# Patient Record
Sex: FEMALE | Race: BLACK OR AFRICAN AMERICAN | NOT HISPANIC OR LATINO | Employment: UNEMPLOYED | URBAN - METROPOLITAN AREA
[De-identification: names, ages, dates, MRNs, and addresses within clinical notes are randomized per-mention and may not be internally consistent; named-entity substitution may affect disease eponyms.]

---

## 2022-04-09 ENCOUNTER — HISTORICAL (OUTPATIENT)
Dept: ADMINISTRATIVE | Facility: HOSPITAL | Age: 37
End: 2022-04-09

## 2022-04-27 VITALS
SYSTOLIC BLOOD PRESSURE: 138 MMHG | DIASTOLIC BLOOD PRESSURE: 92 MMHG | WEIGHT: 179 LBS | HEIGHT: 66 IN | BODY MASS INDEX: 28.77 KG/M2

## 2023-06-03 DIAGNOSIS — Z36.2 ENCOUNTER FOR FOLLOW-UP ULTRASOUND OF FETAL ANATOMY: Primary | ICD-10-CM

## 2023-06-09 ENCOUNTER — OFFICE VISIT (OUTPATIENT)
Dept: MATERNAL FETAL MEDICINE | Facility: CLINIC | Age: 38
End: 2023-06-09
Payer: MEDICAID

## 2023-06-09 ENCOUNTER — PROCEDURE VISIT (OUTPATIENT)
Dept: MATERNAL FETAL MEDICINE | Facility: CLINIC | Age: 38
End: 2023-06-09
Payer: MEDICAID

## 2023-06-09 VITALS
DIASTOLIC BLOOD PRESSURE: 71 MMHG | WEIGHT: 285 LBS | BODY MASS INDEX: 45.8 KG/M2 | HEART RATE: 76 BPM | SYSTOLIC BLOOD PRESSURE: 101 MMHG | HEIGHT: 66 IN

## 2023-06-09 DIAGNOSIS — Z36.2 ENCOUNTER FOR FOLLOW-UP ULTRASOUND OF FETAL ANATOMY: Primary | ICD-10-CM

## 2023-06-09 DIAGNOSIS — O10.013 PRE-EXISTING ESSENTIAL HYPERTENSION COMPLICATING PREGNANCY IN THIRD TRIMESTER: Primary | ICD-10-CM

## 2023-06-09 DIAGNOSIS — Z36.2 ENCOUNTER FOR FOLLOW-UP ULTRASOUND OF FETAL ANATOMY: ICD-10-CM

## 2023-06-09 DIAGNOSIS — O09.523 SUPERVISION OF ELDERLY MULTIGRAVIDA IN THIRD TRIMESTER (>=35 YEARS OLD AT TIME OF DELIVERY): ICD-10-CM

## 2023-06-09 DIAGNOSIS — O99.213 OBESITY COMPLICATING PREGNANCY IN THIRD TRIMESTER: ICD-10-CM

## 2023-06-09 PROCEDURE — 3078F PR MOST RECENT DIASTOLIC BLOOD PRESSURE < 80 MM HG: ICD-10-PCS | Mod: CPTII,S$GLB,, | Performed by: OBSTETRICS & GYNECOLOGY

## 2023-06-09 PROCEDURE — 3008F BODY MASS INDEX DOCD: CPT | Mod: CPTII,S$GLB,, | Performed by: OBSTETRICS & GYNECOLOGY

## 2023-06-09 PROCEDURE — 3074F PR MOST RECENT SYSTOLIC BLOOD PRESSURE < 130 MM HG: ICD-10-PCS | Mod: CPTII,S$GLB,, | Performed by: OBSTETRICS & GYNECOLOGY

## 2023-06-09 PROCEDURE — 76816 OB US FOLLOW-UP PER FETUS: CPT | Mod: S$GLB,,, | Performed by: OBSTETRICS & GYNECOLOGY

## 2023-06-09 PROCEDURE — 3074F SYST BP LT 130 MM HG: CPT | Mod: CPTII,S$GLB,, | Performed by: OBSTETRICS & GYNECOLOGY

## 2023-06-09 PROCEDURE — 99214 OFFICE O/P EST MOD 30 MIN: CPT | Mod: TH,25,S$GLB, | Performed by: OBSTETRICS & GYNECOLOGY

## 2023-06-09 PROCEDURE — 1159F PR MEDICATION LIST DOCUMENTED IN MEDICAL RECORD: ICD-10-PCS | Mod: CPTII,S$GLB,, | Performed by: OBSTETRICS & GYNECOLOGY

## 2023-06-09 PROCEDURE — 1159F MED LIST DOCD IN RCRD: CPT | Mod: CPTII,S$GLB,, | Performed by: OBSTETRICS & GYNECOLOGY

## 2023-06-09 PROCEDURE — 76819 FETAL BIOPHYS PROFIL W/O NST: CPT | Mod: S$GLB,,, | Performed by: OBSTETRICS & GYNECOLOGY

## 2023-06-09 PROCEDURE — 3008F PR BODY MASS INDEX (BMI) DOCUMENTED: ICD-10-PCS | Mod: CPTII,S$GLB,, | Performed by: OBSTETRICS & GYNECOLOGY

## 2023-06-09 PROCEDURE — 3078F DIAST BP <80 MM HG: CPT | Mod: CPTII,S$GLB,, | Performed by: OBSTETRICS & GYNECOLOGY

## 2023-06-09 PROCEDURE — 99214 PR OFFICE/OUTPT VISIT, EST, LEVL IV, 30-39 MIN: ICD-10-PCS | Mod: TH,25,S$GLB, | Performed by: OBSTETRICS & GYNECOLOGY

## 2023-06-09 PROCEDURE — 76816 PR  US,PREGNANT UTERUS,F/U,TRANSABD APP: ICD-10-PCS | Mod: S$GLB,,, | Performed by: OBSTETRICS & GYNECOLOGY

## 2023-06-09 PROCEDURE — 76819 PR US, OB, FETAL BIOPHYSICAL, W/O NST: ICD-10-PCS | Mod: S$GLB,,, | Performed by: OBSTETRICS & GYNECOLOGY

## 2023-06-09 RX ORDER — PNV 112/IRON/FOLIC/OM3/DHA/EPA 3.33-.33MG
3 TABLET,CHEWABLE ORAL
COMMUNITY
Start: 2023-06-08

## 2023-06-09 RX ORDER — ASPIRIN 81 MG/1
81 TABLET ORAL DAILY
COMMUNITY

## 2023-06-09 RX ORDER — AZITHROMYCIN 250 MG/1
TABLET, FILM COATED ORAL
COMMUNITY
Start: 2023-01-19

## 2023-06-09 RX ORDER — FERROUS SULFATE TAB 325 MG (65 MG ELEMENTAL FE) 325 (65 FE) MG
TAB ORAL
COMMUNITY
Start: 2023-05-09

## 2023-06-09 RX ORDER — NIFEDIPINE 30 MG/1
30 TABLET, EXTENDED RELEASE ORAL
COMMUNITY
Start: 2023-06-08

## 2023-06-09 NOTE — PROGRESS NOTES
Maternal Fetal Medicine follow up consult    Subjective     Patient ID: Pauly Hameed is a 38 y.o. female  at 34w0d gestation with Estimated Date of Delivery: 23  who is here for follow  up consultation by M.    Chief Complaint: [facility] follow up with us  (CHTN and Elevated BMI)    She is of advanced maternal age and will be 38 by the EDC. She had negative cfDNA with primary OB and declined genetic amnio. She has CHTN, diagnosed in . She is currently on Procardia XL 30mg daily. On 3-7-23, 24 hour urine protein 594mg around 21 weeks. She had elevated BMI over 45 at consult visit.On 3-7-23, 1 hour .    Pregnancy complications include:   There is no problem list on file for this patient.       No changes to medical, surgical, family, social, or obstetric history.    Interval history since last M visit: None.    HPI She denies any leaking fluid, vaginal bleeding, contractions, decreased fetal movement. Denies headaches, visual disturbances, or epigastric pain    Medications:  Current Outpatient Medications   Medication Instructions    aspirin (ECOTRIN) 81 mg, Oral, Daily, 2 daily    azithromycin (Z-MARILEE) 250 MG tablet No dose, route, or frequency recorded.    FEROSUL 325 mg (65 mg iron) Tab tablet Oral    NIFEdipine (PROCARDIA-XL) 30 mg, Oral    VITAFOL GUMMIES 3.33 mg iron- 0.33 mg Chew 3 tablets, Oral       Review of Systems   Constitutional:  Negative for activity change.   Eyes:  Negative for visual disturbance.   Respiratory: Negative.     Cardiovascular:  Negative for leg swelling.   Gastrointestinal:  Negative for abdominal pain, constipation, diarrhea, nausea, vomiting and reflux.   Genitourinary:  Negative for bladder incontinence, frequency, pelvic pain, vaginal bleeding and vaginal discharge.        Good fetal movements   Musculoskeletal:  Negative for back pain.   Neurological:  Negative for headaches.   All other systems reviewed and are negative.        Objective     Physical  Exam  Vitals and nursing note reviewed.   Constitutional:       Appearance: Normal appearance.      Comments: Increased BMI   HENT:      Head: Normocephalic and atraumatic.      Nose: Nose normal. No congestion.   Eyes:      Conjunctiva/sclera: Conjunctivae normal.   Cardiovascular:      Rate and Rhythm: Normal rate.   Pulmonary:      Effort: Pulmonary effort is normal.   Abdominal:      Palpations: Abdomen is soft.   Skin:     Findings: No bruising or rash.   Neurological:      Mental Status: She is alert and oriented to person, place, and time.   Psychiatric:         Mood and Affect: Mood normal.         Behavior: Behavior normal.         Thought Content: Thought content normal.         Judgment: Judgment normal.          Assessment and Plan     ASSESSMENT/PLAN:     38 y.o.  female with IUP at 34w0d    No problem-specific Assessment & Plan notes found for this encounter.    AMA  With low normal fetal growth  (2104g at 19% on 2023), Normal TORI, BPP 8/8. Normal S/D ratio on UAD.    She had negative cfDNA; declined genetic amnio. Reviewed need for routine  evaluation.    We will do f/u ultrasound to recheck fetal growth in 4 weeks. Recommend twice weekly fetal testing WITH PRIMARY OB with added risk of CHTN on medication.    CHTN  Chronic hypertension complicates up to 2-5% of pregnancies and is associated with significant adverse pregnancy outcomes including  birth, fetal growth restriction, fetal demise, placental abruption, and  delivery.    With blood pressure 101/71, she is to continue Procardia XL 30mg daily-, She is also to follow a low sodium diet avoiding any excessive weight gain.    On 3-7-23, 24 hour urine protein 594mg around 21 weeks, suspect underlying kidney concern, likely related to hypertension, would benefit from postpartum Nephrology follow up and further evaluation, including possible biopsy,    With her higher risk of superimposed preeclampsia with morbidity  and mortality associated with that, agreed to continue 81mg aspirin bid and continue until 34-35 weeks then will adjust to daily dosing until delivery. Reviewed preeclampsia precautions.    Among patients with uncomplicated chronic hypertension with no additional risk factors, delivery at 38-39 weeks appears to provide optimal balance between the risk of adverse fetal and  outcomes. However, WITH MULTIPLE RISKS, will reassess closer to EDC to determine optimal timeframe or GA for delivery, based on current evaluation at that time.    Increased BMI over 45  Normal weight gain since last visit. Advised to continue to follow low calorie, low fat, low Na diet avoiding any additional excessive weight gain. Excess weight gain would be associated with worsening hypertension, gestational diabetes and adverse  outcomes, including fetal demise in utero.    Preoperative antibiotics and thromboprophylaxis with use if pneumatic compression devices is recommended in those undergoing  delivery. Thromboprophylaxis should also be use with those on prolonged immobilization.    With higher risks for gestational diabetes with BMI greater than 45, she had normal early GCT. With normal result, recommend repeat 1hr GCT.    No follow-ups on file.       Components of this note were documented using voice recognition systems; and are subject to errors not corrected at proof reading.  Please contact the author for any clarifications.

## 2023-06-11 PROBLEM — O10.013 PRE-EXISTING ESSENTIAL HYPERTENSION COMPLICATING PREGNANCY IN THIRD TRIMESTER: Status: ACTIVE | Noted: 2023-06-11

## 2023-06-11 PROBLEM — O09.523 SUPERVISION OF ELDERLY MULTIGRAVIDA IN THIRD TRIMESTER (>=35 YEARS OLD AT TIME OF DELIVERY): Status: ACTIVE | Noted: 2023-06-11

## 2023-06-11 PROBLEM — O99.213 OBESITY COMPLICATING PREGNANCY IN THIRD TRIMESTER: Status: ACTIVE | Noted: 2023-06-11

## 2023-06-11 NOTE — ASSESSMENT & PLAN NOTE
ncreased BMI over 45  Normal weight gain since last visit. Advised to continue to follow low calorie, low fat, low Na diet avoiding any additional excessive weight gain. Excess weight gain would be associated with worsening hypertension, gestational diabetes and adverse  outcomes, including fetal demise in utero.    Preoperative antibiotics and thromboprophylaxis with use if pneumatic compression devices is recommended in those undergoing  delivery. Thromboprophylaxis should also be use with those on prolonged immobilization.

## 2023-06-11 NOTE — ASSESSMENT & PLAN NOTE
Chronic hypertension complicates up to 2-5% of pregnancies and is associated with significant adverse pregnancy outcomes including  birth, fetal growth restriction, fetal demise, placental abruption, and  delivery.    With blood pressure 101/71, she is to continue Procardia XL 30mg daily-, She is also to follow a low sodium diet avoiding any excessive weight gain.    On 3-7-23, 24 hour urine protein 594mg around 21 weeks, suspect underlying kidney concern, likely related to hypertension, would benefit from postpartum Nephrology follow up and further evaluation, including possible biopsy,    With her higher risk of superimposed preeclampsia with morbidity and mortality associated with that, agreed to continue 81mg aspirin bid and continue until 34 6/7weeks then will adjust to daily dosing until delivery. Reviewed preeclampsia precautions.    Among patients with uncomplicated chronic hypertension with no additional risk factors, delivery at 38-39 weeks appears to provide optimal balance between the risk of adverse fetal and  outcomes. However, WITH MULTIPLE RISKS, will reassess closer to EDC to determine optimal timeframe or GA for delivery, based on current evaluation at that time.

## 2023-06-11 NOTE — ASSESSMENT & PLAN NOTE
With low normal fetal growth  (2104g at 19% on 2023), Normal TORI, BPP 8/8. Normal S/D ratio on UAD.    She had negative cfDNA; declined genetic amnio. Reviewed need for routine  evaluation.    We will do f/u ultrasound to recheck fetal growth in 4 weeks. Recommend twice weekly fetal testing WITH PRIMARY OB with added risk of CHTN on medication.  We will plan to see in 2 weeks next week she will have twice weekly testing with Dr. Ibarra

## 2023-06-29 DIAGNOSIS — O10.013 PRE-EXISTING ESSENTIAL HYPERTENSION COMPLICATING PREGNANCY IN THIRD TRIMESTER: ICD-10-CM

## 2023-06-29 DIAGNOSIS — O09.523 SUPERVISION OF ELDERLY MULTIGRAVIDA IN THIRD TRIMESTER (>=35 YEARS OLD AT TIME OF DELIVERY): Primary | ICD-10-CM

## 2023-06-30 ENCOUNTER — PROCEDURE VISIT (OUTPATIENT)
Dept: MATERNAL FETAL MEDICINE | Facility: CLINIC | Age: 38
End: 2023-06-30
Payer: MEDICAID

## 2023-06-30 DIAGNOSIS — O09.523 SUPERVISION OF ELDERLY MULTIGRAVIDA IN THIRD TRIMESTER (>=35 YEARS OLD AT TIME OF DELIVERY): ICD-10-CM

## 2023-06-30 DIAGNOSIS — O10.013 PRE-EXISTING ESSENTIAL HYPERTENSION COMPLICATING PREGNANCY IN THIRD TRIMESTER: ICD-10-CM

## 2023-06-30 PROCEDURE — 76819 US MFM PROCEDURE (VIEWPOINT): ICD-10-PCS | Mod: S$GLB,,, | Performed by: OBSTETRICS & GYNECOLOGY

## 2023-06-30 PROCEDURE — 76820 US MFM PROCEDURE (VIEWPOINT): ICD-10-PCS | Mod: S$GLB,,, | Performed by: OBSTETRICS & GYNECOLOGY

## 2023-06-30 PROCEDURE — 76816 US MFM PROCEDURE (VIEWPOINT): ICD-10-PCS | Mod: S$GLB,,, | Performed by: OBSTETRICS & GYNECOLOGY

## 2023-06-30 PROCEDURE — 76819 FETAL BIOPHYS PROFIL W/O NST: CPT | Mod: S$GLB,,, | Performed by: OBSTETRICS & GYNECOLOGY

## 2023-06-30 PROCEDURE — 76820 UMBILICAL ARTERY ECHO: CPT | Mod: S$GLB,,, | Performed by: OBSTETRICS & GYNECOLOGY

## 2023-06-30 PROCEDURE — 76816 OB US FOLLOW-UP PER FETUS: CPT | Mod: S$GLB,,, | Performed by: OBSTETRICS & GYNECOLOGY

## 2023-07-07 ENCOUNTER — OFFICE VISIT (OUTPATIENT)
Dept: MATERNAL FETAL MEDICINE | Facility: CLINIC | Age: 38
End: 2023-07-07
Payer: MEDICAID

## 2023-07-07 ENCOUNTER — PROCEDURE VISIT (OUTPATIENT)
Dept: MATERNAL FETAL MEDICINE | Facility: CLINIC | Age: 38
End: 2023-07-07
Payer: MEDICAID

## 2023-07-07 VITALS
DIASTOLIC BLOOD PRESSURE: 74 MMHG | HEIGHT: 66 IN | SYSTOLIC BLOOD PRESSURE: 119 MMHG | HEART RATE: 75 BPM | BODY MASS INDEX: 45.96 KG/M2 | WEIGHT: 286 LBS

## 2023-07-07 DIAGNOSIS — Z36.2 ENCOUNTER FOR FOLLOW-UP ULTRASOUND OF FETAL ANATOMY: ICD-10-CM

## 2023-07-07 DIAGNOSIS — O09.523 SUPERVISION OF ELDERLY MULTIGRAVIDA IN THIRD TRIMESTER (>=35 YEARS OLD AT TIME OF DELIVERY): Primary | ICD-10-CM

## 2023-07-07 DIAGNOSIS — O10.013 PRE-EXISTING ESSENTIAL HYPERTENSION COMPLICATING PREGNANCY IN THIRD TRIMESTER: ICD-10-CM

## 2023-07-07 DIAGNOSIS — O99.213 OBESITY COMPLICATING PREGNANCY IN THIRD TRIMESTER: ICD-10-CM

## 2023-07-07 PROCEDURE — 3074F SYST BP LT 130 MM HG: CPT | Mod: CPTII,S$GLB,, | Performed by: OBSTETRICS & GYNECOLOGY

## 2023-07-07 PROCEDURE — 99213 OFFICE O/P EST LOW 20 MIN: CPT | Mod: TH,S$GLB,, | Performed by: OBSTETRICS & GYNECOLOGY

## 2023-07-07 PROCEDURE — 3008F BODY MASS INDEX DOCD: CPT | Mod: CPTII,S$GLB,, | Performed by: OBSTETRICS & GYNECOLOGY

## 2023-07-07 PROCEDURE — 3078F PR MOST RECENT DIASTOLIC BLOOD PRESSURE < 80 MM HG: ICD-10-PCS | Mod: CPTII,S$GLB,, | Performed by: OBSTETRICS & GYNECOLOGY

## 2023-07-07 PROCEDURE — 3074F PR MOST RECENT SYSTOLIC BLOOD PRESSURE < 130 MM HG: ICD-10-PCS | Mod: CPTII,S$GLB,, | Performed by: OBSTETRICS & GYNECOLOGY

## 2023-07-07 PROCEDURE — 1159F PR MEDICATION LIST DOCUMENTED IN MEDICAL RECORD: ICD-10-PCS | Mod: CPTII,S$GLB,, | Performed by: OBSTETRICS & GYNECOLOGY

## 2023-07-07 PROCEDURE — 3078F DIAST BP <80 MM HG: CPT | Mod: CPTII,S$GLB,, | Performed by: OBSTETRICS & GYNECOLOGY

## 2023-07-07 PROCEDURE — 3008F PR BODY MASS INDEX (BMI) DOCUMENTED: ICD-10-PCS | Mod: CPTII,S$GLB,, | Performed by: OBSTETRICS & GYNECOLOGY

## 2023-07-07 PROCEDURE — 76819 FETAL BIOPHYS PROFIL W/O NST: CPT | Mod: S$GLB,,, | Performed by: OBSTETRICS & GYNECOLOGY

## 2023-07-07 PROCEDURE — 1159F MED LIST DOCD IN RCRD: CPT | Mod: CPTII,S$GLB,, | Performed by: OBSTETRICS & GYNECOLOGY

## 2023-07-07 PROCEDURE — 99213 PR OFFICE/OUTPT VISIT, EST, LEVL III, 20-29 MIN: ICD-10-PCS | Mod: TH,S$GLB,, | Performed by: OBSTETRICS & GYNECOLOGY

## 2023-07-07 PROCEDURE — 76819 PR US, OB, FETAL BIOPHYSICAL, W/O NST: ICD-10-PCS | Mod: S$GLB,,, | Performed by: OBSTETRICS & GYNECOLOGY

## 2023-07-07 NOTE — PROGRESS NOTES
Maternal Fetal Medicine follow up consult    Subjective     Patient ID: Pauly Hameed is a 38 y.o. female  at 38w0d gestation with Estimated Date of Delivery: 23  who is here for follow  up consultation by M.    Chief Complaint: M follow up with [facility] (HTN.)    She is of advanced maternal age and will be 38 by the EDC. She had negative cfDNA with primary OB and declined genetic amnio. She has CHTN, diagnosed in . She is currently on Procardia XL 30mg daily. On 3-7-23, 24 hour urine protein 594mg around 21 weeks. She had elevated BMI over 45 at consult visit.On 3-7-23, 1 hour .  She is on baby aspirin twice a day and did not change twice a day as previously advised at 35 weeks.    Pregnancy complications include:   Patient Active Problem List   Diagnosis    Supervision of elderly multigravida in third trimester (>=35 years old at time of delivery)    Pre-existing essential hypertension complicating pregnancy in third trimester     increased BMI complicating pregnancy in third trimester        No changes to medical, surgical, family, social, or obstetric history.    Interval history since last MFM visit: None.    HPI She denies any leaking fluid, vaginal bleeding, contractions, decreased fetal movement. Denies headaches, visual disturbances, or epigastric pain    Medications:  Current Outpatient Medications   Medication Instructions    aspirin (ECOTRIN) 81 mg, Oral, Daily, 2 daily    azithromycin (Z-MARILEE) 250 MG tablet No dose, route, or frequency recorded.    FEROSUL 325 mg (65 mg iron) Tab tablet Oral    NIFEdipine (PROCARDIA-XL) 30 mg, Oral    VITAFOL GUMMIES 3.33 mg iron- 0.33 mg Chew 3 tablets, Oral       Review of Systems   Constitutional:  Negative for activity change.   Eyes:  Negative for visual disturbance.   Respiratory: Negative.     Cardiovascular:  Negative for leg swelling.   Gastrointestinal:  Negative for abdominal pain, constipation, diarrhea, nausea, vomiting and reflux.    Genitourinary:  Negative for bladder incontinence, frequency, pelvic pain, vaginal bleeding and vaginal discharge.        Good fetal movements   Musculoskeletal:  Negative for back pain.   Neurological:  Negative for headaches.   All other systems reviewed and are negative.        Objective     Physical Exam  Vitals and nursing note reviewed.   Constitutional:       Appearance: Normal appearance.      Comments: Increased BMI   HENT:      Head: Normocephalic and atraumatic.      Nose: Nose normal. No congestion.   Eyes:      Conjunctiva/sclera: Conjunctivae normal.   Cardiovascular:      Rate and Rhythm: Normal rate.   Pulmonary:      Effort: Pulmonary effort is normal.   Abdominal:      Palpations: Abdomen is soft.   Skin:     Findings: No bruising or rash.   Neurological:      Mental Status: She is alert and oriented to person, place, and time.   Psychiatric:         Mood and Affect: Mood normal.         Behavior: Behavior normal.         Thought Content: Thought content normal.         Judgment: Judgment normal.          Assessment and Plan     ASSESSMENT/PLAN:     38 y.o.  female with IUP at 38w0d    No problem-specific Assessment & Plan notes found for this encounter.    AMA  With low normal fetal growth  (2104g at 19% on 2023), Normal TORI, BPP .     She had negative cfDNA; declined genetic amnio. Reviewed need for routine  evaluation.    Continue twice weekly fetal testing WITH PRIMARY OB with added risk of CHTN on medication.    CHTN  Chronic hypertension complicates up to 2-5% of pregnancies and is associated with significant adverse pregnancy outcomes including  birth, fetal growth restriction, fetal demise, placental abruption, and  delivery.    With blood pressure 119/74, she is to continue Procardia XL 30mg daily-, She is also to follow a low sodium diet avoiding any excessive weight gain.    On 3-7-23, 24 hour urine protein 594mg around 21 weeks, suspect  underlying kidney concern, likely related to hypertension, would benefit from postpartum Nephrology follow up and further evaluation, including possible biopsy,    She was advised to change baby aspirin to 81 mg daily.. Reviewed preeclampsia precautions.    With multiple risk factors and chronic hypertension, recommend delivery at 38-38 6/7 th week as optimal balance between prematurity risks and risk of continued pregnancy.    Increased BMI over 45  Normal weight gain since last visit. Advised to continue to follow low calorie, low fat, low Na diet avoiding any additional excessive weight gain. Excess weight gain would be associated with worsening hypertension, gestational diabetes and adverse  outcomes, including fetal demise in utero.    Preoperative antibiotics and thromboprophylaxis with use if pneumatic compression devices is recommended in those undergoing  delivery. Thromboprophylaxis should also be use with those on prolonged immobilization.    With higher risks for gestational diabetes with BMI greater than 45, she had normal early GCT. With normal result, recommend repeat 1hr GCT.    Follow up in about 1 week (around 2023) for MFM follow-up, BPP.       Components of this note were documented using voice recognition systems; and are subject to errors not corrected at proof reading.  Please contact the author for any clarifications.